# Patient Record
Sex: MALE | Race: BLACK OR AFRICAN AMERICAN | URBAN - METROPOLITAN AREA
[De-identification: names, ages, dates, MRNs, and addresses within clinical notes are randomized per-mention and may not be internally consistent; named-entity substitution may affect disease eponyms.]

---

## 2021-08-02 ENCOUNTER — OFFICE VISIT (OUTPATIENT)
Dept: FAMILY MEDICINE CLINIC | Age: 26
End: 2021-08-02

## 2021-08-02 VITALS
SYSTOLIC BLOOD PRESSURE: 118 MMHG | HEART RATE: 72 BPM | BODY MASS INDEX: 38.51 KG/M2 | HEIGHT: 69 IN | OXYGEN SATURATION: 97 % | WEIGHT: 260 LBS | DIASTOLIC BLOOD PRESSURE: 75 MMHG | TEMPERATURE: 98.5 F

## 2021-08-02 DIAGNOSIS — J45.901 MILD ASTHMA WITH EXACERBATION, UNSPECIFIED WHETHER PERSISTENT: Primary | ICD-10-CM

## 2021-08-02 PROCEDURE — 99203 OFFICE O/P NEW LOW 30 MIN: CPT | Performed by: FAMILY MEDICINE

## 2021-08-02 RX ORDER — PREDNISONE 20 MG/1
40 TABLET ORAL
Qty: 10 TABLET | Refills: 0 | Status: SHIPPED | OUTPATIENT
Start: 2021-08-02

## 2021-08-02 RX ORDER — ALBUTEROL SULFATE 90 UG/1
2 AEROSOL, METERED RESPIRATORY (INHALATION)
Qty: 1 INHALER | Refills: 5 | Status: SHIPPED | OUTPATIENT
Start: 2021-08-02

## 2021-08-02 RX ORDER — LORATADINE 10 MG/1
10 TABLET ORAL
COMMUNITY

## 2021-08-02 RX ORDER — DIPHENHYDRAMINE HCL 25 MG
25 CAPSULE ORAL
COMMUNITY

## 2021-08-02 NOTE — PROGRESS NOTES
0 I have printed AVS and reviewed it with patient today. Return in about 3 months (around 11/2/2021) for asthma. Patient verbalized understanding.  Zoila Pool RN

## 2021-08-02 NOTE — PROGRESS NOTES
Cecelia Mariscal is a 31 y.o. male    Chief Complaint   Patient presents with    Asthma     requesting inhaler; problems in spring and summer; chest tightness, wheezing, intermittently;       1. Have you been to the ER, urgent care clinic since your last visit? Hospitalized since your last visit? No    2. Have you seen or consulted any other health care providers outside of the [address] [place] [address] since your last visit? Include any pap smears or colon screening.  No    Visit Vitals  /75 (BP 1 Location: Left upper arm, BP Patient Position: Sitting)   Pulse 72   Temp 98.5 °F (36.9 °C) (Temporal)   Ht 5' 9.49\" (1.765 m)   Wt 260 lb (117.9 kg)   SpO2 97%   BMI 37.86 kg/m²

## 2021-08-02 NOTE — PROGRESS NOTES
HISTORY OF PRESENT ILLNESS  Marly Florentino is a 31 y.o. male with a past medical history of asthma presenting to clinic presenting to clinic to establish care and receive treatment for his asthma. He says that he gets symptoms yearly every spring and summer especially after cutting grass or when his allergies act up. He describes the symptoms as chest tightness, cough, and wheezing. Heat and humidity makes symptoms worse. Cold water helps the symptoms. He has these events about 2 days per week. Allergic to pollen and grass. Used to be allergic peanuts and peaches but now eats them without problems. No allergies to medications. Had first asthma attack a child at age 10 and went to the ED. He had an albuterol inhaler and nebulizer as a kid. Took Singulair for a while but stopped. 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 Past medical history  -broke hand 4 years ago    Family  -Mother breast cancer  -Family history of allergies    Social  -quit smoking about 1 year- 1/4 to 1/2 pack per day  -alcohol: 2 shots/day  -history of marijuana use, trying to stop for work    Review of Systems   Respiratory: Positive for cough, shortness of breath and wheezing. Cardiovascular: Negative. Negative for chest pain and palpitations. Gastrointestinal: Negative. Negative for abdominal pain, constipation and diarrhea. Genitourinary: Negative. Musculoskeletal: Negative. Neurological: Negative. /75 (BP 1 Location: Left upper arm, BP Patient Position: Sitting)   Pulse 72   Temp 98.5 °F (36.9 °C) (Temporal)   Ht 5' 9.49\" (1.765 m)   Wt 260 lb (117.9 kg)   SpO2 97%   BMI 37.86 kg/m²     Physical Exam  Constitutional:       Appearance: Normal appearance.    HENT:      Right Ear: Tympanic membrane and ear canal normal.      Left Ear: Tympanic membrane and ear canal normal.      Mouth/Throat:      Mouth: Mucous membranes are moist. Eyes:      Conjunctiva/sclera: Conjunctivae normal.      Pupils: Pupils are equal, round, and reactive to light. Cardiovascular:      Rate and Rhythm: Normal rate and regular rhythm. Pulmonary:      Effort: Pulmonary effort is normal.      Breath sounds: Wheezing present. Abdominal:      General: Bowel sounds are normal. There is no distension. Tenderness: There is no abdominal tenderness. Skin:     General: Skin is warm and dry. Findings: No rash. Neurological:      Mental Status: He is alert. ASSESSMENT and PLAN  Diagnoses and all orders for this visit:    1. Mild asthma with exacerbation, unspecified whether persistent  -     albuterol (PROVENTIL HFA, VENTOLIN HFA, PROAIR HFA) 90 mcg/actuation inhaler; Take 2 Puffs by inhalation every six (6) hours as needed for Wheezing.  -     predniSONE (DELTASONE) 20 mg tablet; Take 40 mg by mouth daily (with breakfast). Mr. Chris Silva is a 31year old with a past medical history of asthma presenting to clinic to establish care and receive treatment for recurrent asthma symptoms that occur ~2 days per week in the spring and summer. 1) Mild asthma with exacerbation: history of symptoms 2d per week during the spring and summer every year. Previously on Singulair with albuterol rescue inhaler but has not been treated in years.  Wheezing present on exam.  -prednisone 40mg for 5 days  -albuterol inh PRN for exacerbations    Follow up appointment in [facility] [place] [address]   3:43 PM  08/02/21

## 2021-08-02 NOTE — PROGRESS NOTES
HISTORY OF PRESENT ILLNESS  Ev Gan is a 31 y.o. male with a past medical history of asthma presenting to clinic presenting to clinic to establish care and receive treatment for his asthma. He says that he gets symptoms yearly every spring and summer especially after cutting grass or when his allergies act up. He describes the symptoms as chest tightness, cough, and wheezing. Heat and humidity makes symptoms worse. Cold water helps the symptoms. He has these events about 2 days per week. Allergic to pollen and grass. Used to be allergic peanuts and peaches but now eats them without problems. No allergies to medications. Had first asthma attack a child at age 10 and went to the ED. He had an albuterol inhaler and nebulizer as a kid. Took Singulair for a while but stopped. 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     Past medical history  -broke hand 4 years ago     Family  -Mother breast cancer  -Family history of allergies     Social  -quit smoking about 1 year- 1/4 to 1/2 pack per day  -alcohol: 2 shots/day  -history of marijuana use, trying to stop for work       Review of Systems   Respiratory: Positive for cough, shortness of breath and wheezing. Cardiovascular: Negative. Negative for chest pain and palpitations. Gastrointestinal: Negative. Negative for abdominal pain, constipation and diarrhea. Genitourinary: Negative. Musculoskeletal: Negative. Neurological: Negative.       /75 (BP 1 Location: Left upper arm, BP Patient Position: Sitting)   Pulse 72   Temp 98.5 °F (36.9 °C) (Temporal)   Ht 5' 9.49\" (1.765 m)   Wt 260 lb (117.9 kg)   SpO2 97%   BMI 37.86 kg/m²   Physical Exam  Constitutional:       Appearance: Normal appearance.    HENT:      Right Ear: Tympanic membrane and ear canal normal.      Left Ear: Tympanic membrane and ear canal normal.      Mouth/Throat:      Mouth: Mucous membranes are moist. Eyes:      Conjunctiva/sclera: Conjunctivae normal.      Pupils: Pupils are equal, round, and reactive to light. Cardiovascular:      Rate and Rhythm: Normal rate and regular rhythm. Pulmonary:      Effort: Pulmonary effort is normal.      Breath sounds: Wheezing present. Abdominal:      General: Bowel sounds are normal. There is no distension. Tenderness: There is no abdominal tenderness. Skin:     General: Skin is warm and dry. Findings: No rash. Neurological:      Mental Status: He is alert. ASSESSMENT and PLAN  Diagnoses and all orders for this visit:     1. Mild asthma with exacerbation, unspecified whether persistent  -     albuterol (PROVENTIL HFA, VENTOLIN HFA, PROAIR HFA) 90 mcg/actuation inhaler; Take 2 Puffs by inhalation every six (6) hours as needed for Wheezing.  -     predniSONE (DELTASONE) 20 mg tablet; Take 40 mg by mouth daily (with breakfast).       Mr. Dina Salgado is a 31year old with a past medical history of asthma presenting to clinic to establish care and receive treatment for recurrent asthma symptoms that occur ~2 days per week in the spring and summer.    1) Mild asthma with exacerbation: history of symptoms 2d per week during the spring and summer every year. Previously on Singulair with albuterol rescue inhaler but has not been treated in years.  Wheezing present on exam.  -prednisone 40mg for 5 days  -albuterol inh PRN for exacerbations     Follow up appointment in

## 2023-02-11 ENCOUNTER — HOSPITAL ENCOUNTER (EMERGENCY)
Age: 28
Discharge: HOME | End: 2023-02-11
Attending: EMERGENCY MEDICINE

## 2023-02-11 ENCOUNTER — APPOINTMENT (OUTPATIENT)
Dept: GENERAL RADIOLOGY | Age: 28
End: 2023-02-11
Attending: EMERGENCY MEDICINE

## 2023-02-11 VITALS
OXYGEN SATURATION: 97 % | SYSTOLIC BLOOD PRESSURE: 138 MMHG | DIASTOLIC BLOOD PRESSURE: 86 MMHG | HEART RATE: 94 BPM | RESPIRATION RATE: 18 BRPM | TEMPERATURE: 98.1 F

## 2023-02-11 DIAGNOSIS — R32 URINARY INCONTINENCE, UNSPECIFIED TYPE: Primary | ICD-10-CM

## 2023-02-11 LAB
APPEARANCE UR: CLEAR
BACTERIA URNS QL MICRO: NEGATIVE /HPF
BILIRUB UR QL: NEGATIVE
COLOR UR: ABNORMAL
EPITH CASTS URNS QL MICRO: ABNORMAL /LPF
GLUCOSE UR STRIP.AUTO-MCNC: NEGATIVE MG/DL
HGB UR QL STRIP: NEGATIVE
HYALINE CASTS URNS QL MICRO: ABNORMAL /LPF (ref 0–5)
KETONES UR QL STRIP.AUTO: 15 MG/DL
LEUKOCYTE ESTERASE UR QL STRIP.AUTO: NEGATIVE
NITRITE UR QL STRIP.AUTO: NEGATIVE
PH UR STRIP: 5.5 (ref 5–8)
PROT UR STRIP-MCNC: 30 MG/DL
RBC #/AREA URNS HPF: ABNORMAL /HPF (ref 0–5)
UR CULT HOLD, URHOLD: NORMAL
UROBILINOGEN UR QL STRIP.AUTO: 0.2 EU/DL (ref 0.2–1)
WBC URNS QL MICRO: ABNORMAL /HPF (ref 0–4)

## 2023-02-11 PROCEDURE — 99284 EMERGENCY DEPT VISIT MOD MDM: CPT

## 2023-02-11 PROCEDURE — 74019 RADEX ABDOMEN 2 VIEWS: CPT

## 2023-02-11 PROCEDURE — 81001 URINALYSIS AUTO W/SCOPE: CPT

## 2023-02-11 NOTE — ED TRIAGE NOTES
TRIAGE NOTE:  Patient arrives ambulatory with c/o losing control of bladder while sleeping patient reports seems to happen every 3 months. States \"its been happening since I was a kid maybe\".

## 2023-02-11 NOTE — ED PROVIDER NOTES
Other  Pertinent negatives include no chest pain, no abdominal pain, no headaches and no shortness of breath. Patient is a 26-year-old male with past medical history significant for asthma who presents to the ED reporting episodic urinary incontinence about every 3 months while sleeping for years. He denies any history of seizures, alcohol or drug abuse. He states he has noticed that his dreams can cause the incontinence. Denies fever, cold symptoms, headache, neck pain, visual changes, focal weakness , unexplained weight change or rash. Denies any fever, difficulty breathing, difficulty swallowing, SOB or chest pain. Denies any nausea, vomiting, constipation or diarrhea. Pt. Reports he has not had any medications today prior to arrival.  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 Past Medical History:   Diagnosis Date    Asthma        No past surgical history on file. Family History:   Problem Relation Age of Onset    Cancer Mother        Social History     Socioeconomic History    Marital status: SINGLE     Spouse name: Not on file    Number of children: Not on file    Years of education: Not on file    Highest education level: Not on file   Occupational History    Not on file   Tobacco Use    Smoking status: Former     Packs/day: 0.50     Types: Cigarettes, Cigars     Quit date: 2020     Years since quittin.5    Smokeless tobacco: Never   Vaping Use    Vaping Use: Never used   Substance and Sexual Activity    Alcohol use:  Yes     Alcohol/week: 14.0 standard drinks     Types: 14 Shots of liquor per week    Drug use: Not on file    Sexual activity: Yes     Partners: Female     Birth control/protection: None   Other Topics Concern    Not on file   Social History Narrative    Not on file     Social Determinants of Health     Financial Resource Strain: Not on file   Food Insecurity: Not on file   Transportation Needs: Not on file   Physical Activity: Not on file   Stress: Not on file Social Connections: Not on file   Intimate Partner Violence: Not on file   Housing Stability: Not on file         ALLERGIES: Patient has no known allergies. Review of Systems   Constitutional:  Negative for activity change, appetite change, fever and unexpected weight change. HENT:  Negative for trouble swallowing. Eyes:  Negative for visual disturbance. Respiratory:  Negative for cough and shortness of breath. Cardiovascular:  Negative for chest pain, palpitations and leg swelling. Gastrointestinal:  Negative for abdominal pain, constipation, diarrhea, nausea and vomiting. Genitourinary:  Negative for dysuria and flank pain. Musculoskeletal:  Negative for back pain, gait problem and neck pain. Skin:  Negative for rash. Neurological:  Negative for headaches. All other systems reviewed and are negative. Vitals:    02/11/23 1355   BP: 138/86   Pulse: 94   Resp: 18   Temp: 98.1 °F (36.7 °C)   SpO2: 97%            Physical Exam  Vitals and nursing note reviewed. Constitutional:       General: He is not in acute distress. Appearance: Normal appearance. He is obese. He is not ill-appearing, toxic-appearing or diaphoretic. HENT:      Head: Normocephalic. Mouth/Throat:      Mouth: Mucous membranes are moist.      Pharynx: No posterior oropharyngeal erythema. Cardiovascular:      Rate and Rhythm: Normal rate and regular rhythm. Pulmonary:      Effort: Pulmonary effort is normal.      Breath sounds: Normal breath sounds. Abdominal:      General: Bowel sounds are normal. There is no distension. Palpations: Abdomen is soft. Tenderness: There is no abdominal tenderness. There is no guarding or rebound. Hernia: No hernia is present. Genitourinary:     Penis: Normal.       Comments: Circumcised  Musculoskeletal:         General: Normal range of motion. Cervical back: Normal range of motion and neck supple. Tenderness present. Right lower leg: No edema. Left lower leg: No edema. Lymphadenopathy:      Cervical: No cervical adenopathy. Skin:     General: Skin is warm and dry. Findings: No rash. Neurological:      Mental Status: He is alert and oriented to person, place, and time. Medical Decision Making  Amount and/or Complexity of Data Reviewed  Labs: ordered. Radiology: ordered. Procedures      Labs Reviewed   URINALYSIS W/MICROSCOPIC - Abnormal; Notable for the following components:       Result Value    Protein 30 (*)     Ketone 15 (*)     All other components within normal limits   URINE CULTURE HOLD SAMPLE       XR ABD FLAT/ ERECT    Result Date: 2/11/2023  No acute findings. Patient has been reexamined and denies any further complaints of pain or discomfort. He was given referral to urology for further evaluation and treatment. Discussed plan of care with Dr. Bernie Pittman. 5:37 PM  Patient's results and plan of care have been reviewed with the patient and his wife. Patient and/or family have verbally conveyed their understanding and agreement of the patient's signs, symptoms, diagnosis, treatment and prognosis and additionally agree to follow up as recommended or return to the Emergency Room should his condition change prior to follow-up. Discharge instructions have also been provided to the patient with some educational information regarding his diagnosis as well a list of reasons why he would want to return to the ER prior to his follow-up appointment should his condition change. Rahul Alcantara NP